# Patient Record
Sex: FEMALE | Race: OTHER | Employment: UNEMPLOYED | URBAN - NONMETROPOLITAN AREA
[De-identification: names, ages, dates, MRNs, and addresses within clinical notes are randomized per-mention and may not be internally consistent; named-entity substitution may affect disease eponyms.]

---

## 2024-10-08 ENCOUNTER — HOSPITAL ENCOUNTER (EMERGENCY)
Age: 64
Discharge: HOME | End: 2024-10-08
Attending: FAMILY MEDICINE

## 2024-10-08 VITALS
HEART RATE: 76 BPM | OXYGEN SATURATION: 100 % | RESPIRATION RATE: 16 BRPM | TEMPERATURE: 98.5 F | SYSTOLIC BLOOD PRESSURE: 196 MMHG | DIASTOLIC BLOOD PRESSURE: 96 MMHG

## 2024-10-08 DIAGNOSIS — I10 ESSENTIAL HYPERTENSION: ICD-10-CM

## 2024-10-08 DIAGNOSIS — Z91.148 NONCOMPLIANCE WITH MEDICATION REGIMEN: ICD-10-CM

## 2024-10-08 DIAGNOSIS — J30.9 ALLERGIC RHINITIS, UNSPECIFIED SEASONALITY, UNSPECIFIED TRIGGER: Primary | ICD-10-CM

## 2024-10-08 PROCEDURE — 99283 EMERGENCY DEPT VISIT LOW MDM: CPT

## 2024-10-08 RX ORDER — FLUTICASONE PROPIONATE 50 MCG
1 SPRAY, SUSPENSION (ML) NASAL DAILY
Qty: 32 G | Refills: 1 | Status: SHIPPED | OUTPATIENT
Start: 2024-10-08

## 2024-10-08 RX ORDER — HYDROCHLOROTHIAZIDE 12.5 MG/1
12.5 CAPSULE ORAL EVERY MORNING
Qty: 90 CAPSULE | Refills: 0 | Status: SHIPPED | OUTPATIENT
Start: 2024-10-08

## 2024-10-08 ASSESSMENT — LIFESTYLE VARIABLES
HOW OFTEN DO YOU HAVE A DRINK CONTAINING ALCOHOL: NEVER
HOW MANY STANDARD DRINKS CONTAINING ALCOHOL DO YOU HAVE ON A TYPICAL DAY: PATIENT DOES NOT DRINK

## 2024-10-08 ASSESSMENT — PAIN DESCRIPTION - DESCRIPTORS: DESCRIPTORS: SHARP

## 2024-10-08 ASSESSMENT — PAIN SCALES - GENERAL: PAINLEVEL_OUTOF10: 2

## 2024-10-08 ASSESSMENT — PAIN DESCRIPTION - LOCATION: LOCATION: OTHER (COMMENT)

## 2024-10-08 NOTE — ED NOTES
[employer] representative Alex [id number] used to review discharge paperwork and assess for questions and concerns related to discharge. Medications and pharmacy information reviewed. Patient and family deny questions or concerns.

## 2024-10-08 NOTE — ED TRIAGE NOTES
Patient reports that she has had a runny nose and congestion for the past 5 years, states that it got worse about 3 days ago. Denies any fever, no sick contact. Patient reports using a nasal spray from her doctor in [facility].

## 2024-10-08 NOTE — ED PROVIDER NOTES
[facility] EMERGENCY DEPT  EMERGENCY DEPARTMENT ENCOUNTER      Pt Name: Klaudia Ravi  MRN: [id number]  Birthdate 1960  Date of evaluation: 10/8/2024  Provider: Jona Fajardo DO  7:35 AM    CHIEF COMPLAINT     Runny nose      HISTORY OF PRESENT ILLNESS    Klaudia Ravi is a 64 y.o. female who presents to the emergency department through the use of an  as patient only speaks Russian and is from [facility] she states that she has been having a runny nose it has been an ongoing issue for several days.  She has had allergies in the past she has been using some nasal spray that was written for her by her doctor in [facility].  She denies any fevers does not smoke coughing is worse at nighttime she denies any headaches at this time.  She is supposed to be on blood pressure medicine but has not taken it in about 3 months her plan is to go back to [facility].  She denies any chest pains, she states she only has some shortness of breath because of the nasal drainage and coughing at times.  Denies any nausea vomiting sweating.  Denies any changes in vision denies any sick contacts.  Patient states that    HPI    Nursing Notes were reviewed.    REVIEW OF SYSTEMS             PAST MEDICAL HISTORY     Past Medical History:   Diagnosis Date    Hypertension          SURGICAL HISTORY     History reviewed. No pertinent surgical history.      CURRENT MEDICATIONS       Previous Medications    No medications on file       ALLERGIES     Patient has no known allergies.    FAMILY HISTORY     History reviewed. No pertinent family history.       SOCIAL HISTORY       Social History     Socioeconomic History    Marital status: Single     Spouse name: None    Number of children: None    Years of education: None    Highest education level: None   Tobacco Use    Smoking status: Never    Smokeless tobacco: Never       SCREENINGS         Regina Coma Scale  Eye Opening: Spontaneous  Best Verbal Response:

## 2024-10-08 NOTE — DISCHARGE INSTRUCTIONS
As we spoke my recommendation at this time is to use some over-the-counter Zyrtec.  I have written a prescription for some nasal spray, use this spray 1 spray each nostril daily.  I also wrote for some hydrochlorothiazide.  This is to help with your blood pressure.  I do recommend if you are going to stay here for a long time before going back to [facility] that you establish care with a primary care provider to get your blood pressure better controlled and to avoid having heart problems, stroke, or kidney disease problems.  Return to this emergency department or any emergency department if there is any questions or concerns.

## 2024-10-08 NOTE — ED NOTES
Bedside and Verbal shift change report given to Hawa (oncoming nurse) by Elizabeth (offgoing nurse). Report included the following information Nurse Handoff Report, ED Encounter Summary, ED SBAR, MAR, Recent Results, and Neuro Assessment.

## 2024-10-08 NOTE — ED NOTES
Dr Fajardo at bedside to assess patient. [employer] being utilized. Agent code [id number]. Patients family at bedside.